# Patient Record
(demographics unavailable — no encounter records)

---

## 2024-10-21 NOTE — RESULTS/DATA
[TextEntry] : Labs: 10/16/24 143/4.3/106/27/32/1.77/gfr29/ca9 UPCR 0.377   06/11/24 142/4.3/110/21/37/1.66/gfr32/ca9  2/9/2024: bun 42 cr 1.92 egfr 27 ca 10.1 hgb 10.5 upcr  0.417  10/16/2023: 142/4.8/109/24/39/1.98, EGFR 26, calcium 9.2  9/22/2023: 141/4.1/12/27/47/2.33, eGFR 21, calcium 11.5, PTH less than 6, vitamin D 54  6/15/23 139/4/103/25/37/1.83 egfr 28 ca 9.7 cbc 7.2/12.2/246 PTH 30  2/20/23: 138/4.2/100/26/38/1.94 eGFR 27  10/11.6/256 UPCR 0.5  10/18/22:  139/4.4/103/25/40/1.95 ca 9.6 Vit D 112  7/19/22: 141/337/101/25/45/2, eGFR 26, calcium 10.4, PTH 9 CBC and/12/245 Vitamin D total 104 UPCR 0.2 412  3/16/22: 140/3.5/104/25/30/1.75 ca 8.9 eGFR 28 UPCR 0.465  CBC: 7.8/11/352  11/9/2021 pth 41 mag 1.7 uric acid 3.1 137/3.2/101/25/49/2.27 gfr 21 ca 9.5 cbc 9.5/12.5/272  6/29/2021 138/4.2/103/18/50.2/1.92 gfr 25.6 ca 10.2 cbc 8.4/11.9/238  3/11/2021 140/4.0/103/26/36/1.84 gfr 27 ca 9.5 cbc 8.2/12.0/226  11/13/20 141/3.8/104/28/32/1.80 gfr 27 ca 9.6 cbc 6.9/11.8/247 upcr 225 8/15/20: bun 29 cr 1..78 gfr 28 ca 10.2 cbc: 7.1/12.5/245   5/20/20: cr 2.05/39/gfr 24 K 3.5/glu 107  Ca 9.3/4.6/d 38/pth 33 Mg++ 1,9 Hg 13.2  12/18/19:cr 2.09/31/gfr 23 K 3.8/glu 110 Mg 1.1  11/20/19:cr 1.87/29/gfr 26 K 3.8/jta824 Hg 12.5   10/23/19:cr 1.98/28/gfr 25 K 3.3/glu 117/A1c 6% Ca 9.2/3.2/d 42/pth 37 Hg 12.0  6/26/19:cr 1.82/34/gfr 28 K 3.8/glu 129/A1c 6.6%  Hg 12.2/wbc 11.500  2/27/19:cr 1.65/33/gfr 31 K 3.6/glu 137/A1c 5.8% Ca 9.6/4.0/d 32/pth 31 a/c: 60.1 uric 3.1  10/24/18:cr 1.82/52/gfr 28 K 4.3/glu 73 Hg 12.8/wbc 13.700   6/18/18:cr 1.77/33/gfr 29 K 4/glu 141 Hg 12.2 p/c: 400 Ca 9.5/3.8/d 29.3/pth 67  2/19/18:cr 2.04/39/gfr 24 K 3.8/glu 132 Hg 12.7  11/20/17:cr 2.01/34/gfr 25 K 4.1/glu 158 Ca 9.2/3.2/d 26.8/pth 46 Hg 11.7/wbc 16.100(N: 13.700)  8/14/17:cr 1.71/30/gfr 30 K 3.3/glu 133 Ca 9.4/4.0/d 21.2/pth 31 Hg 11.9  4/17/17:cr 1.61/31/gfr 32 K 3.7/glu 137/A1c 6.9% a/c: 31 Ca 9.3/3.4/d 32/pth 22

## 2024-10-21 NOTE — PLAN
[TextEntry] : Plan: Increase Spironolactone to 25 mg daily 1 tablet Hold Losartan 25 mg until we verify her medications  Restart Vitamin D 50,000 Unit 1x a week  Keep on current medications HCTZ 12.5 mg daily  Calcitriol 0.25 mg twice a week  Follow up 6 months

## 2024-10-21 NOTE — ASSESSMENT
[FreeTextEntry1] : Assessment: CKD stage 3B Renal function is essentially unchanged  No peripheral edema BP is slightly low  Chronic sciatica  Mild proteinuria  Serum calcium is the same  Not sure if she is on losartan 25 mg daily

## 2024-10-21 NOTE — REVIEW OF SYSTEMS
[TextEntry] : Review of Systems: General: No weight loss, No recent fever, chills HEENT: no headache, no change in vision or hearing Pulmonary: No SOB, or cough  CVS: No chest pain, WOODY, or change in exercise tolerance  Gastrointestinal: No Nausea/vomiting/constipation/diarrhea : No complaint of dysuria or urinary frequency, no excess foaming  Skin: Denies no new rash, lesions, ulcer  [place] skeletal: No edema  Neurological: No headache, dizziness, vertigo

## 2024-10-21 NOTE — PHYSICAL EXAM
[TextEntry] : Physical Examination: General: Not in acute distress Head: Normocephalic, no lesions Eyes:EOMI, Fundi normal Throat: Clear, no exudates or lesions Chest: Lungs clear, no rales, no rhonchi, no wheezes CVS: S1/S2, RR, no murmurs, no rubs Abdomen: Soft NT/ND, +BS Extremities: No edema, ulcers, or cyanosis Neurological: Alert, awake, no gross focal deficits

## 2024-10-21 NOTE — HISTORY OF PRESENT ILLNESS
[FreeTextEntry1] : 10/21/24 Follow up No new complaint     7/16/2012 first office visit in 2004 (creatinine 2.7, hypertension, high grade renal artery stenosis (, right kidney), improvement in renal function.  Creatinine down to 1.7 mg percent off, lisinopril, type 2 diabetes mellitus since 1970s, obesity, secondary hyperparathyroidism, not 2012

## 2025-04-21 NOTE — HISTORY OF PRESENT ILLNESS
[FreeTextEntry1] : 04/21/25 Reason for visit Follow up for  CKD stage 4 Proteinuria HTN  PMHx CKD stage 4 Proteinuria HTN HLD Hyperkalemia DM2 SUMAN  10/21/24 Follow up No new complaint     7/16/2012 first office visit in 2004 (creatinine 2.7, hypertension, high grade renal artery stenosis (, right kidney), improvement in renal function.  Creatinine down to 1.7 mg percent off, lisinopril, type 2 diabetes mellitus since 1970s, obesity, secondary hyperparathyroidism, not 2012

## 2025-04-21 NOTE — RESULTS/DATA
[TextEntry] : Labs: 4/14/25 138/4/102/22/61/2.37/gfr21/ca10 HGB 11 UPCR 0.391  10/16/24 143/4.3/106/27/32/1.77/gfr29/ca9 UPCR 0.377   06/11/24 142/4.3/110/21/37/1.66/gfr32/ca9  2/9/2024: bun 42 cr 1.92 egfr 27 ca 10.1 hgb 10.5 upcr  0.417  10/16/2023: 142/4.8/109/24/39/1.98, EGFR 26, calcium 9.2  9/22/2023: 141/4.1/12/27/47/2.33, eGFR 21, calcium 11.5, PTH less than 6, vitamin D 54  6/15/23 139/4/103/25/37/1.83 egfr 28 ca 9.7 cbc 7.2/12.2/246 PTH 30  2/20/23: 138/4.2/100/26/38/1.94 eGFR 27  10/11.6/256 UPCR 0.5  10/18/22:  139/4.4/103/25/40/1.95 ca 9.6 Vit D 112  7/19/22: 141/337/101/25/45/2, eGFR 26, calcium 10.4, PTH 9 CBC and/12/245 Vitamin D total 104 UPCR 0.2 412  3/16/22: 140/3.5/104/25/30/1.75 ca 8.9 eGFR 28 UPCR 0.465  CBC: 7.8/11/352  11/9/2021 pth 41 mag 1.7 uric acid 3.1 137/3.2/101/25/49/2.27 gfr 21 ca 9.5 cbc 9.5/12.5/272  6/29/2021 138/4.2/103/18/50.2/1.92 gfr 25.6 ca 10.2 cbc 8.4/11.9/238  3/11/2021 140/4.0/103/26/36/1.84 gfr 27 ca 9.5 cbc 8.2/12.0/226  11/13/20 141/3.8/104/28/32/1.80 gfr 27 ca 9.6 cbc 6.9/11.8/247 upcr 225 8/15/20: bun 29 cr 1..78 gfr 28 ca 10.2 cbc: 7.1/12.5/245   5/20/20: cr 2.05/39/gfr 24 K 3.5/glu 107  Ca 9.3/4.6/d 38/pth 33 Mg++ 1,9 Hg 13.2  12/18/19:cr 2.09/31/gfr 23 K 3.8/glu 110 Mg 1.1  11/20/19:cr 1.87/29/gfr 26 K 3.8/bjh139 Hg 12.5   10/23/19:cr 1.98/28/gfr 25 K 3.3/glu 117/A1c 6% Ca 9.2/3.2/d 42/pth 37 Hg 12.0  6/26/19:cr 1.82/34/gfr 28 K 3.8/glu 129/A1c 6.6%  Hg 12.2/wbc 11.500  2/27/19:cr 1.65/33/gfr 31 K 3.6/glu 137/A1c 5.8% Ca 9.6/4.0/d 32/pth 31 a/c: 60.1 uric 3.1  10/24/18:cr 1.82/52/gfr 28 K 4.3/glu 73 Hg 12.8/wbc 13.700   6/18/18:cr 1.77/33/gfr 29 K 4/glu 141 Hg 12.2 p/c: 400 Ca 9.5/3.8/d 29.3/pth 67  2/19/18:cr 2.04/39/gfr 24 K 3.8/glu 132 Hg 12.7  11/20/17:cr 2.01/34/gfr 25 K 4.1/glu 158 Ca 9.2/3.2/d 26.8/pth 46 Hg 11.7/wbc 16.100(N: 13.700)  8/14/17:cr 1.71/30/gfr 30 K 3.3/glu 133 Ca 9.4/4.0/d 21.2/pth 31 Hg 11.9  4/17/17:cr 1.61/31/gfr 32 K 3.7/glu 137/A1c 6.9% a/c: 31 Ca 9.3/3.4/d 32/pth 22

## 2025-04-21 NOTE — ASSESSMENT
[FreeTextEntry1] : Assessment  SATURNINO can be due to prerenal failure since being started on Spironolactone CKD stage 4: Renal function has worsened HTN: controlled No peripheral edema, states she has swelling at home Proteinuria, unchanged

## 2025-04-21 NOTE — PLAN
[TextEntry] : Plan: CKD stage 4: Change HCTZ 12.5 mg to every other day from daily, Hold Spironolactone 25 mg daily  HTN: Change HCTZ 12.5 mg to every other day from daily Proteinuria: Low protein diet, Hold Spironolactone 25 mg daily  Resume Losartan if she has proteinuria in next labwork, verify her medications next visit CKDMBD: Continue Vitamin D 50,000 Unit 1x a week, Calcitriol 0.25 mg twice a week  Follow up with recommended workups in 1 month

## 2025-06-09 NOTE — ASSESSMENT
[FreeTextEntry1] : Assessment  CKD stage 4: Renal function has improved HTN: controlled 1+ edema, worsened  Proteinuria, unchanged Lungs are clear

## 2025-06-09 NOTE — PLAN
[TextEntry] : Plan: Discontinued HCTZ 12.5 mg 2 months ago by her PCP   Discontinued Spironolactone 25 mg  CKD stage 4: Increase Torsemide to 10 mg from 5 mg daily, Continue Losartan 25 mg daily HTN: Increase Torsemide to 10 mg from 5 mg daily, Continue Losartan 25 mg daily will eventually increase to 50 mg if renal function doesn't get worse after increasing torsemide dose  Proteinuria: Low protein diet, Continue Losartan 25 mg daily will eventually increase to 50 mg if renal function doesn't get worse after increasing torsemide dose  CKDMBD: Continue Vitamin D 50,000 Unit 1x a week, Calcitriol 0.25 mg twice a week  Follow up with recommended workups in 1 month-tele  Follow up in office in 4 months

## 2025-06-09 NOTE — HISTORY OF PRESENT ILLNESS
[FreeTextEntry1] : 06/09/25 Reason for visit Follow up for  CKD stage 4 Proteinuria HTN She has a 24 hour event heart monitor since she has been having SOB. She stops using it in 2 weeks  She has been having LE edema   04/21/25 Reason for visit Follow up for  CKD stage 4 Proteinuria HTN  PMHx CKD stage 4 Proteinuria HTN HLD Hyperkalemia DM2 SUMAN  10/21/24 Follow up No new complaint     7/16/2012 first office visit in 2004 (creatinine 2.7, hypertension, high grade renal artery stenosis (, right kidney), improvement in renal function.  Creatinine down to 1.7 mg percent off, lisinopril, type 2 diabetes mellitus since 1970s, obesity, secondary hyperparathyroidism, not 2012

## 2025-06-09 NOTE — RESULTS/DATA
[TextEntry] : Labs: 5/13/25 142/4.6/104/26/37/1.92/gfr27/ca9.5 HGB 11.1 UPCR 0.375   4/14/25 138/4/102/22/61/2.37/gfr21/ca10 HGB 11 UPCR 0.391  10/16/24 143/4.3/106/27/32/1.77/gfr29/ca9 UPCR 0.377   06/11/24 142/4.3/110/21/37/1.66/gfr32/ca9  2/9/2024: bun 42 cr 1.92 egfr 27 ca 10.1 hgb 10.5 upcr  0.417  10/16/2023: 142/4.8/109/24/39/1.98, EGFR 26, calcium 9.2  9/22/2023: 141/4.1/12/27/47/2.33, eGFR 21, calcium 11.5, PTH less than 6, vitamin D 54  6/15/23 139/4/103/25/37/1.83 egfr 28 ca 9.7 cbc 7.2/12.2/246 PTH 30  2/20/23: 138/4.2/100/26/38/1.94 eGFR 27  10/11.6/256 UPCR 0.5  10/18/22:  139/4.4/103/25/40/1.95 ca 9.6 Vit D 112  7/19/22: 141/337/101/25/45/2, eGFR 26, calcium 10.4, PTH 9 CBC and/12/245 Vitamin D total 104 UPCR 0.2 412  3/16/22: 140/3.5/104/25/30/1.75 ca 8.9 eGFR 28 UPCR 0.465  CBC: 7.8/11/352  11/9/2021 pth 41 mag 1.7 uric acid 3.1 137/3.2/101/25/49/2.27 gfr 21 ca 9.5 cbc 9.5/12.5/272  6/29/2021 138/4.2/103/18/50.2/1.92 gfr 25.6 ca 10.2 cbc 8.4/11.9/238  3/11/2021 140/4.0/103/26/36/1.84 gfr 27 ca 9.5 cbc 8.2/12.0/226  11/13/20 141/3.8/104/28/32/1.80 gfr 27 ca 9.6 cbc 6.9/11.8/247 upcr 225 8/15/20: bun 29 cr 1..78 gfr 28 ca 10.2 cbc: 7.1/12.5/245   5/20/20: cr 2.05/39/gfr 24 K 3.5/glu 107  Ca 9.3/4.6/d 38/pth 33 Mg++ 1,9 Hg 13.2  12/18/19:cr 2.09/31/gfr 23 K 3.8/glu 110 Mg 1.1  11/20/19:cr 1.87/29/gfr 26 K 3.8/gfm842 Hg 12.5   10/23/19:cr 1.98/28/gfr 25 K 3.3/glu 117/A1c 6% Ca 9.2/3.2/d 42/pth 37 Hg 12.0  6/26/19:cr 1.82/34/gfr 28 K 3.8/glu 129/A1c 6.6%  Hg 12.2/wbc 11.500  2/27/19:cr 1.65/33/gfr 31 K 3.6/glu 137/A1c 5.8% Ca 9.6/4.0/d 32/pth 31 a/c: 60.1 uric 3.1  10/24/18:cr 1.82/52/gfr 28 K 4.3/glu 73 Hg 12.8/wbc 13.700   6/18/18:cr 1.77/33/gfr 29 K 4/glu 141 Hg 12.2 p/c: 400 Ca 9.5/3.8/d 29.3/pth 67  2/19/18:cr 2.04/39/gfr 24 K 3.8/glu 132 Hg 12.7  11/20/17:cr 2.01/34/gfr 25 K 4.1/glu 158 Ca 9.2/3.2/d 26.8/pth 46 Hg 11.7/wbc 16.100(N: 13.700)  8/14/17:cr 1.71/30/gfr 30 K 3.3/glu 133 Ca 9.4/4.0/d 21.2/pth 31 Hg 11.9  4/17/17:cr 1.61/31/gfr 32 K 3.7/glu 137/A1c 6.9% a/c: 31 Ca 9.3/3.4/d 32/pth 22

## 2025-07-21 NOTE — HISTORY OF PRESENT ILLNESS
[FreeTextEntry1] : 07/21/25 Reason for visit Follow up for  CKD stage 4 Proteinuria HTN  06/09/25 Reason for visit Follow up for  CKD stage 4 Proteinuria HTN She has a 24 hour event heart monitor since she has been having SOB. She stops using it in 2 weeks  She has been having LE edema   04/21/25 Reason for visit Follow up for  CKD stage 4 Proteinuria HTN  PMHx CKD stage 4 Proteinuria HTN HLD Hyperkalemia DM2 SUMAN  10/21/24 Follow up No new complaint     7/16/2012 first office visit in 2004 (creatinine 2.7, hypertension, high grade renal artery stenosis (, right kidney), improvement in renal function.  Creatinine down to 1.7 mg percent off, lisinopril, type 2 diabetes mellitus since 1970s, obesity, secondary hyperparathyroidism, not 2012

## 2025-07-21 NOTE — ASSESSMENT
[FreeTextEntry1] : Assessment  CKD stage 4: Renal function is stable  HTN: controlled 140/58 No peripheral edema  Proteinuria, unchanged

## 2025-07-21 NOTE — PLAN
[TextEntry] : Plan: CKD stage 4: Continue Torsemide 10 mg daily, Increase Losartan to 50 mg from 25 mg daily HTN: Continue Torsemide 10 mg daily, Increase Losartan to 50 mg from 25 mg daily Proteinuria: Low protein diet, Increase Losartan to 50 mg from 25 mg daily CKDMBD: Continue Vitamin D 50,000 Unit 1x a week, Calcitriol 0.25 mg twice a week  Follow up with recommended workups in 4 months

## 2025-07-21 NOTE — RESULTS/DATA
[TextEntry] : Labs: 7/16/25 140/4/102/22/29/1.85/gfr28/ca8.9 UPCR 0.4 CBC 12.20/12.4/280 Blood trace   5/13/25 142/4.6/104/26/37/1.92/gfr27/ca9.5 HGB 11.1 UPCR 0.375   4/14/25 138/4/102/22/61/2.37/gfr21/ca10 HGB 11 UPCR 0.391  10/16/24 143/4.3/106/27/32/1.77/gfr29/ca9 UPCR 0.377   06/11/24 142/4.3/110/21/37/1.66/gfr32/ca9  2/9/2024: bun 42 cr 1.92 egfr 27 ca 10.1 hgb 10.5 upcr  0.417  10/16/2023: 142/4.8/109/24/39/1.98, EGFR 26, calcium 9.2  9/22/2023: 141/4.1/12/27/47/2.33, eGFR 21, calcium 11.5, PTH less than 6, vitamin D 54  6/15/23 139/4/103/25/37/1.83 egfr 28 ca 9.7 cbc 7.2/12.2/246 PTH 30  2/20/23: 138/4.2/100/26/38/1.94 eGFR 27  10/11.6/256 UPCR 0.5  10/18/22:  139/4.4/103/25/40/1.95 ca 9.6 Vit D 112  7/19/22: 141/337/101/25/45/2, eGFR 26, calcium 10.4, PTH 9 CBC and/12/245 Vitamin D total 104 UPCR 0.2 412  3/16/22: 140/3.5/104/25/30/1.75 ca 8.9 eGFR 28 UPCR 0.465  CBC: 7.8/11/352  11/9/2021 pth 41 mag 1.7 uric acid 3.1 137/3.2/101/25/49/2.27 gfr 21 ca 9.5 cbc 9.5/12.5/272  6/29/2021 138/4.2/103/18/50.2/1.92 gfr 25.6 ca 10.2 cbc 8.4/11.9/238  3/11/2021 140/4.0/103/26/36/1.84 gfr 27 ca 9.5 cbc 8.2/12.0/226  11/13/20 141/3.8/104/28/32/1.80 gfr 27 ca 9.6 cbc 6.9/11.8/247 upcr 225 8/15/20: bun 29 cr 1..78 gfr 28 ca 10.2 cbc: 7.1/12.5/245   5/20/20: cr 2.05/39/gfr 24 K 3.5/glu 107  Ca 9.3/4.6/d 38/pth 33 Mg++ 1,9 Hg 13.2  12/18/19:cr 2.09/31/gfr 23 K 3.8/glu 110 Mg 1.1  11/20/19:cr 1.87/29/gfr 26 K 3.8/ysm954 Hg 12.5   10/23/19:cr 1.98/28/gfr 25 K 3.3/glu 117/A1c 6% Ca 9.2/3.2/d 42/pth 37 Hg 12.0  6/26/19:cr 1.82/34/gfr 28 K 3.8/glu 129/A1c 6.6%  Hg 12.2/wbc 11.500  2/27/19:cr 1.65/33/gfr 31 K 3.6/glu 137/A1c 5.8% Ca 9.6/4.0/d 32/pth 31 a/c: 60.1 uric 3.1  10/24/18:cr 1.82/52/gfr 28 K 4.3/glu 73 Hg 12.8/wbc 13.700   6/18/18:cr 1.77/33/gfr 29 K 4/glu 141 Hg 12.2 p/c: 400 Ca 9.5/3.8/d 29.3/pth 67  2/19/18:cr 2.04/39/gfr 24 K 3.8/glu 132 Hg 12.7  11/20/17:cr 2.01/34/gfr 25 K 4.1/glu 158 Ca 9.2/3.2/d 26.8/pth 46 Hg 11.7/wbc 16.100(N: 13.700)  8/14/17:cr 1.71/30/gfr 30 K 3.3/glu 133 Ca 9.4/4.0/d 21.2/pth 31 Hg 11.9  4/17/17:cr 1.61/31/gfr 32 K 3.7/glu 137/A1c 6.9% a/c: 31 Ca 9.3/3.4/d 32/pth 22